# Patient Record
Sex: FEMALE | Race: WHITE
[De-identification: names, ages, dates, MRNs, and addresses within clinical notes are randomized per-mention and may not be internally consistent; named-entity substitution may affect disease eponyms.]

---

## 2018-11-16 NOTE — RADIOLOGY REPORT (SQ)
EXAM DESCRIPTION:  SHOULDER LEFT 2 OR MORE VIEWS



COMPLETED DATE/TIME:  11/16/2018 4:04 pm



REASON FOR STUDY:  ACUTE PAIN OF LEFT SHOULDER M25.512  PAIN IN LEFT SHOULDER



COMPARISON:  None.



NUMBER OF VIEWS:  Three views.



TECHNIQUE:  Internal rotation, external rotation, and Y view images acquired of the left shoulder.



LIMITATIONS:  None.



FINDINGS:  MINERALIZATION: Normal.

BONES: There is a fracture of the greater tuberosity.

JOINTS: No dislocation.

VISUALIZED LUNGS AND RIBS: No pneumothorax.  No rib fracture.

SOFT TISSUES: No radiopaque foreign body.

OTHER: No other significant finding.



IMPRESSION:  Fracture of the greater tuberosity.



TECHNICAL DOCUMENTATION:  JOB ID:  [id number]

 2011 [employer]- All Rights Reserved



Reading location - IP/workstation name: [email or web address]

## 2019-03-01 NOTE — WOMENS IMAGING REPORT
EXAM DESCRIPTION:  BILAT SCREENING MAMMO W/CAD



COMPLETED DATE/TIME:  2/28/2019 3:08 pm



REASON FOR STUDY:  ROUTINE BILATERAL SCREENING,Z12.31 Z12.31  ENCNTR SCREEN MAMMOGRAM FOR MALIGNANT N
EOPLASM OF DOLLY



COMPARISON:  2016



TECHNIQUE:  Standard craniocaudal and mediolateral oblique views of each breast recorded using [employer] acquisition.



LIMITATIONS:  None.



FINDINGS:  No masses, calcifications or architectural distortion. No areas of suspicion.

Read with the assistance of CAD.

.[facility] - R2 [employer] Version 1.3

.[facility] - R2 [employer] Version 2.1

.[facility] - R2 [employer] Version 2.4

.[facility] [place]2 [employer] Version 2.4

.[facility] - R2  Version 9.2



IMPRESSION:  NORMAL MAMMOGRAM.  BIRADS 1.



BREAST DENSITY:  b. There are scattered areas of fibroglandular density.



BIRAD:  1 NEGATIVE



RECOMMENDATION:  ROUTINE SCREENING



COMMENT:  The patient has been notified of the results by letter per SA requirements. Additional no
tification policies are in place for contacting patient with suspicious or incomplete findings.

Quality ID #225: The American College of Radiology recommends an annual screening mammogram for women
 aged 40 years or over. This facility utilizes a reminder system to ensure that all patients receive 
reminder letters, and/or direct phone calls for appointments. This includes reminders for routine scr
eening mammograms, diagnostic mammograms, or other Breast Imaging Interventions when appropriate.  Th
is patient will be placed in the appropriate reminder system.

The American College of Radiology (ACR) has developed recommendations for screening MRI of the breast
s in certain patient populations, to be used in conjunction with mammography.  Breast MRI surveillanc
e may be appropriate for women with more than 20% lifetime risk of developing breast cancer  as deter
mined by genetic testing, significant family history of the disease, or history of mantle radiation f
or Hodgkins Disease.  ACR Practice Guidelines 2008.



TECHNICAL DOCUMENTATION:  FINDING NUMBER: (1)

ASSESSMENT: (1)

JOB ID:  [id number]

 2011 [employer]- All Rights Reserved



Reading location - IP/workstation name: [email or web address]

## 2020-09-10 NOTE — WOMENS IMAGING REPORT
EXAM DESCRIPTION:  BILAT SCREENING MAMMO W/CAD



IMAGES COMPLETED DATE/TIME:  9/10/2020 2:17 pm



REASON FOR STUDY:  Z12.31 ENCNTR SCREEN MAMMOGRAM FOR MALIGNANT NEOPLASM OF BREAST Z12.31  ENCNTR SCR
EEN MAMMOGRAM FOR MALIGNANT NEOPLASM OF DOLLY



COMPARISON:  2016, 2019



EXAM PARAMETERS:  Standard craniocaudal and mediolateral oblique views of each breast recorded using 
digital acquisition.

Read with the assistance of CAD.

.[facility] - iovox  Version 9.2



LIMITATIONS:  None.



FINDINGS:  No suspicious masses, suspicious calcifications or architectural distortion. No areas of c
oncern.



IMPRESSION:  NEGATIVE MAMMOGRAM.  BIRADS 1



BREAST DENSITY:  b. There are scattered areas of fibroglandular density.



BIRAD:  ASSESSMENT:  1 NEGATIVE



RECOMMENDATION:  ROUTINE SCREENING



COMMENT:  The patient has been notified of the results by letter per MQSA requirements. Additional no
tification policies are in place for contacting patient with suspicious or incomplete findings.

Quality ID #225: The American College of Radiology recommends an annual screening mammogram for women
 aged 40 years or over. This facility utilizes a reminder system to ensure that all patients receive 
reminder letters, and/or direct phone calls for appointments. This includes reminders for routine scr
eening mammograms, diagnostic mammograms, or other Breast Imaging Interventions when appropriate.  Th
is patient will be placed in the appropriate reminder system.



TECHNICAL DOCUMENTATION:  FINDING NUMBER: (1)

ASSESSMENT: (1)

JOB ID:  [id number]

 2011 [employer]- All Rights Reserved



Reading location - IP/workstation name: [email or web address]

## 2020-10-23 NOTE — RADIOLOGY REPORT (SQ)
EXAM DESCRIPTION:  CHEST PA/LATERAL



IMAGES COMPLETED DATE/TIME:  10/23/2020 12:47 pm



REASON FOR STUDY:  RIB PAIN ON RT SIDE



COMPARISON:  None.



EXAM PARAMETERS:  NUMBER OF VIEWS: two views

TECHNIQUE: Digital Frontal and Lateral radiographic views of the chest acquired.

RADIATION DOSE: NA

LIMITATIONS: none



FINDINGS:  LUNGS AND PLEURA: No opacities, masses or pneumothorax. No pleural effusion.

MEDIASTINUM AND HILAR STRUCTURES: No masses or contour abnormalities.

HEART AND VASCULAR STRUCTURES: Heart normal size.  No evidence for failure.

BONES: No acute findings.

HARDWARE: None in the chest.

OTHER: No other significant finding.



IMPRESSION:  NO SIGNIFICANT RADIOGRAPHIC FINDING IN THE CHEST.



TECHNICAL DOCUMENTATION:  JOB ID:  [id number]

 2011 [employer]- All Rights Reserved



Reading location - IP/workstation name: [email or web address]

## 2020-10-31 NOTE — RADIOLOGY REPORT (SQ)
EXAM DESCRIPTION:  CT ABD/PELVIS WITH IV   ORAL



IMAGES COMPLETED DATE/TIME:  10/31/2020 12:09 pm



REASON FOR STUDY:  N/V/D, abd pain x 4 days, worse w/ time



COMPARISON:  None.



TECHNIQUE:  CT scan of the abdomen and pelvis performed using helical scanning technique with dynamic
 intravenous contrast injection.  No oral contrast. Images reviewed with lung, soft tissue, and bone 
windows. Reconstructed coronal and sagittal MPR images reviewed. Delayed images for evaluation of the
 urinary system also acquired. All images stored on PACS.

All CT scanners at this facility use dose modulation, iterative reconstruction, and/or weight based d
osing when appropriate to reduce radiation dose to as low as reasonably achievable (ALARA).

[facility]: Dose Right  [facility]: CareDose    [facility]: Dose Right    [facility]: Teradose 4D    [facility]: Smart Technologies



CONTRAST TYPE AND DOSE:  contrast/concentration: Isovue 350.00 mmol/ml; Total Contrast Delivered: 83.
0 ml; Total Saline Delivered: 24.4 ml



RENAL FUNCTION:  GFR > 60.



RADIATION DOSE:  CT Rad equipment meets quality standard of care and radiation dose reduction techniq
ues were employed. CTDIvol: 12.2 - 16.6 mGy. DLP: 1490 mGy-cm..



LIMITATIONS:  None.



FINDINGS:  LOWER CHEST: Small hiatal hernia.

LIVER: Normal size. No masses.  No dilated ducts.

SPLEEN: Normal size. No focal lesions.

PANCREAS: No masses. No significant calcifications. No adjacent inflammation or peripancreatic fluid 
collections. Pancreatic duct not dilated.

GALLBLADDER: Surgically absent.

ADRENAL GLANDS: No significant masses or asymmetry.

RIGHT KIDNEY AND URETER: No solid masses.   No significant calcifications.   No hydronephrosis or hyd
roureter.

LEFT KIDNEY AND URETER: No solid masses.   No significant calcifications.   No hydronephrosis or hydr
oureter.

AORTA AND VESSELS: No aneurysm. No dissection. Renal arteries, SMA, celiac without stenosis.

RETROPERITONEUM: No retroperitoneal adenopathy, hemorrhage or masses.

BOWEL AND PERITONEAL CAVITY: No masses or inflammatory changes. No free fluid or peritoneal masses.

APPENDIX: Normal.

PELVIS: No mass.  No free fluid. Normal bladder.

ABDOMINAL WALL: No masses. No hernias.

BONES: No significant or acute findings.

OTHER: No other significant finding.



IMPRESSION:  NO SIGNIFICANT OR ACUTE FINDING IN THE ABDOMEN OR PELVIS ON CT SCAN WITH IV CONTRAST.



TECHNICAL DOCUMENTATION:  JOB ID:  [id number]

Quality ID # 436: Final reports with documentation of one or more dose reduction techniques (e.g., Au
tomated exposure control, adjustment of the mA and/or kV according to patient size, use of iterative 
reconstruction technique)

 2011 [employer]- All Rights Reserved



Reading location - IP/workstation name: [email or web address]

## 2020-10-31 NOTE — ER DOCUMENT REPORT
ED General





- General


Chief Complaint: Nausea/Vomiting/Diarrhea


Stated Complaint: NAUSEA/VOMITING/DIARRHEA/CHILLS/RIB PAIN


Time Seen by Provider: 10/31/20 08:51


Primary Care Provider: 


AGUSTIN PICKETT FNP-C [NURSE PRACTITIONER] - Follow up in 3-5 days


BRADLEY FRANCO MD [ACTIVE STAFF] - Follow up in 3-5 days (for nausea, possible 

endoscopy)


TRAVEL OUTSIDE OF THE [place] IN LAST 30 DAYS: No





- HPI


Notes: 





50-year-old female with a history of hypertension, neuropathies and depression 

presents to the emergency room with nausea and vomiting that started 4 days ago.

 Patient states that she feels a little bit dehydrated, she started drinking 

some fluids last night but vomited them up.  Denies any new foods, medications 

or travel.  Has not tried any over-the-counter medications.  Denies any trauma 

or change in level consciousness.  Reports her last bowel movement was 2 days 

ago.  Patient states that she thought she had shingles, there was no rash she 

had a burning sensation to her right rib that she was working up with her 

primary care provider.  Patient is currently on gabapentin which does help 

somewhat with her pain.  Patient states yesterday she was feeling little bit be

tter she ate some soup and overall felt better but today when she woke up she 

felt nauseous.  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 

exposure.  Denies any positive Covid test.  Patient reports she had a 

cholecystectomy in 2016, she still has her appendix.  Patient has had amenorrhea

for several years.  Denies fevers, chills,  chest pain,palpitations,  shortness 

of breath, dyspnea, nausea, vomiting, diarrhea, hematuria,blurred vision, double

vision, loss of vision, speech changes, LH, dizziness, syncope, headaches, 

wheezing, ST, URI, neck pain, weakness, bowel or bladder dysfunction, saddle 

anesthesia, numbness or tingling in bilateral upper or lower extremities 

equally, muscle paralysis, weakness in bilateral upper or lower extremities 

equally or rash. Denies IV drug use.





- Related Data


Allergies/Adverse Reactions: 


                                        





codeine Allergy (Verified 10/31/20 09:09)


   











Past Medical History





- General


Information source: Patient





- Social History


Smoking Status: Unknown if Ever Smoked


Family History: Reviewed & Not Pertinent





Review of Systems





- Review of Systems


Constitutional: No symptoms reported


EENT: No symptoms reported


Cardiovascular: No symptoms reported


Respiratory: No symptoms reported


Gastrointestinal: See HPI


Genitourinary: No symptoms reported


Female Genitourinary: No symptoms reported


Musculoskeletal: No symptoms reported


Skin: No symptoms reported


Hematologic/Lymphatic: No symptoms reported


Neurological/Psychological: No symptoms reported





Physical Exam





- Vital signs


Vitals: 


                                        











Temp Pulse Resp BP Pulse Ox


 


 98.9 F   89   18   152/86 H  96 


 


 10/31/20 08:41  10/31/20 08:41  10/31/20 08:41  10/31/20 08:41  10/31/20 08:41














- Notes


Notes: 








MEDICATIONS: I agree with the patient medications as charted by the RN.





ALLERGIES: I agree with the allergies as charted by the RN.





PAST MEDICAL HISTORY/PAST SURGICAL HISTORY: Reviewed and agree as charted by RN.





SOCIAL HISTORY: Reviewed and agree as charted by RN.





FAMILY HISTORY: No significant familial comorbid conditions directly related to 

patient complaint





ALL OTHER SYSTEMS REVIEWED AND NEGATIVE.











PHYSICAL EXAMINATION:





GENERAL: Well-appearing, well-nourished and in no acute distress.





HEAD: Atraumatic, normocephalic.





EYES: Pupils equal round and reactive to light, extraocular movements intact, 

conjunctiva are normal.





ENT: Nares patent, oropharynx clear without exudates.  Moist mucous membranes.





NECK: Normal range of motion, supple without lymphadenopathy





LUNGS: Breath sounds clear to auscultation bilaterally and equal.  No wheezes 

rales or rhonchi.





HEART: Regular rate and rhythm without murmurs





ABDOMEN: Soft, right upper quadrant abdominal pain, left upper quadrant 

abdominal pain, periumbilical abdominal pain, nondistended abdomen.  No 

guarding, no rebound.  No masses appreciated.  No CVA tenderness appreciated 

bilaterally





Female : deferred





Musculoskeletal: Normal range of motion, no pitting or edema.  No cyanosis.





NEUROLOGICAL: Cranial nerves grossly intact.  Normal speech, normal gait.  

Normal sensory, motor exams





PSYCH: Normal mood, normal affect.





SKIN: Warm, Dry, normal turgor, no rashes or lesions noted.

















Dictation was performed using Dragon voice recognition software





Course





- Re-evaluation


Re-evalutation: 





10/31/20 14:31


Afebrile vital stable no distress.  Nurses notes reviewed.  CBC negative for 

leukocytosis or anemia, CMP negative for hepatic or renal dysfunction, no 

electrolyte disturbances.  Urinalysis does show mild ketones otherwise 

unremarkable.  CT abdomen pelvis negative for any acute findings per radiology. 

Patient given two liters IV fluids, given total of 8 mg of Zofran, 10 of Reglan 

and 10 mg of Compazine.  Patient is a PUI, she was tested for Covid today.  A

dvised to stay home, self quarantine, social distance wash hands frequently.  No

abnormalities showed up on her work-up today, the Covid test is still pending.  

I do have suspicion that the patient may be experiencing a Covid-like 

symptomology.  We will send patient home with a prescription for Zofran, advised

to stay home rest, stay hydrated, avoid any diuretics such as coffee, alcohol or

soda.  Will refer to gastroenterology for possible endoscopy.    Advised to 

follow-up with primary care provider within the next 24 to 48 hours.  After 

performing a Medical Screening Examination, I estimate there is LOW risk for 

ACUTE APPENDICITIS, BOWEL OBSTRUCTION, ACUTE CHOLECYSTITIS, PERFORATED DIVERTI

CULITIS, INCARCERATED HERNIA, PANCREATITIS, PELVIC INFLAMMATORY DISEASE, 

PERFORATED ULCER, ECTOPIC PREGNANCY, or TUBO-OVARIAN ABSCESS, thus I consider 

the discharge disposition reasonable. Also, there is no evidence or peritonitis,

sepsis, or toxicity. I have reevaluated this patient multiple times and no 

significant life threatening changes are noted. The patient and I have discussed

the diagnosis and risks, and we agree with discharging home with close follow-up

with the understanding that symptoms and presentations can change. We also 

discussed returning to the Emergency Department immediately if new or worsening 

symptoms occur. We have discussed the symptoms which are most concerning (e.g., 

bloody stool, fever, changing or worsening pain, vomiting) that necessitate 

immediate return.


10/31/20 16:39





10/31/20 16:39








- Vital Signs


Vital signs: 


                                        











Temp Pulse Resp BP Pulse Ox


 


 98.9 F   89   18   152/86 H  96 


 


 10/31/20 14:47  10/31/20 08:41  10/31/20 08:41  10/31/20 08:41  10/31/20 08:41














- Laboratory


Result Diagrams: 


                                 10/31/20 09:33





                                 10/31/20 09:33


Laboratory results interpreted by me: 


                                        











  10/31/20 10/31/20 10/31/20





  09:33 09:33 13:05


 


MCH  33.6 H  


 


BUN   21 H 


 


Glucose   130 H 


 


Urine Ketones    20 H














Discharge





- Discharge


Clinical Impression: 


 Nausea & vomiting, Dehydration





Condition: Stable


Disposition: HOME, SELF-CARE


Instructions:  COVID-19 Guidance for Persons Under Investigation, Antinausea 

Medication ([facility]), Intravenous (IV) Fluids ([facility]), Reglan ([facility]), Vomiting ([facility])


Additional Instructions: 


Your CT today was completely normal, your blood work was normal as well.  You ar

e a little dehydrated and you were given 2 L of IV fluids as well as antiemetic 

medication.  you will be sent home with antiemetic medication.  Please follow-up

with a gastroenterologist as well as your primary care provider within the next 

24 to 48 hours.  Please return to the emergency room if you experience any 

worsening symptoms such as vomiting with the nausea medication, fever, chest 

pain, shortness of breath.





Return immediately for any new or worsening symptoms.





Follow up with primary care provider, call tomorrow to make followup 

appointment.


Prescriptions: 


Ondansetron [Zofran Odt 4 mg Tablet] 1 - 2 tab PO Q4H PRN #15 tab.rapdis


 PRN Reason: For Nausea/Vomiting


Forms:  Return to Work


Referrals: 


AGUSTIN PICKETT FNP-C [NURSE PRACTITIONER] - Follow up in 3-5 days


BRADLEY FRANCO MD [ACTIVE STAFF] - Follow up in 3-5 days (for nausea, possible 

endoscopy)

## 2020-11-20 ENCOUNTER — HOSPITAL ENCOUNTER (OUTPATIENT)
Dept: HOSPITAL 62 - END | Age: 50
Discharge: HOME | End: 2020-11-20
Attending: INTERNAL MEDICINE
Payer: COMMERCIAL

## 2020-11-20 VITALS — DIASTOLIC BLOOD PRESSURE: 66 MMHG | SYSTOLIC BLOOD PRESSURE: 110 MMHG

## 2020-11-20 DIAGNOSIS — E78.5: ICD-10-CM

## 2020-11-20 DIAGNOSIS — Z90.49: ICD-10-CM

## 2020-11-20 DIAGNOSIS — E66.9: ICD-10-CM

## 2020-11-20 DIAGNOSIS — K29.50: Primary | ICD-10-CM

## 2020-11-20 DIAGNOSIS — Z79.899: ICD-10-CM

## 2020-11-20 DIAGNOSIS — G62.9: ICD-10-CM

## 2020-11-20 DIAGNOSIS — F17.210: ICD-10-CM

## 2020-11-20 DIAGNOSIS — I10: ICD-10-CM

## 2020-11-20 PROCEDURE — 88305 TISSUE EXAM BY PATHOLOGIST: CPT

## 2020-11-20 PROCEDURE — 43239 EGD BIOPSY SINGLE/MULTIPLE: CPT

## 2020-11-20 PROCEDURE — 88342 IMHCHEM/IMCYTCHM 1ST ANTB: CPT

## 2020-11-20 NOTE — OPERATIVE REPORT
Operative Report


DATE OF SURGERY: 11/20/20


Operative Report: 





The risks benefits and alternatives of the procedure explained to the patient in

detail and informed consent is obtained.A GIF Olympus video scope was inserted 

into the patient's mouth and hypopharynx, the esophagus is identified intubated 

and insufflated ,the scope was then advanced through the esophagus stomach and 

duodenum ,retroflexion maneuver is done ,the esophagus stomach and first and 

second portions of the duodenum examined


PREOPERATIVE DIAGNOSIS: Nausea vomiting rule out peptic ulcer disease


POSTOPERATIVE DIAGNOSIS: Gastritis status post biopsy rule out Helicobacter 

pylori


OPERATION: EGD with biopsy


SURGEON: BRADLEY FRANCO


ANESTHESIA: LMAC


TISSUE REMOVED OR ALTERED: As noted above.


COMPLICATIONS: 





None.


ESTIMATED BLOOD LOSS: None.


INTRAOPERATIVE FINDINGS: As noted above.


PROCEDURE: 





Patient tolerated the procedure well.


No immediate postprocedure complications are noted.


Patient is discharged in good condition.


Discharge date 11/20/2020.


Discharge diet: Regular.


Discharge activity: Regular.


2 to 3-week follow-up to discuss findings.


Patient is instructed to call the office or proceed to the emergency room should

there be any further problems or questions.


Wait on the pathology.